# Patient Record
Sex: MALE | Race: WHITE
[De-identification: names, ages, dates, MRNs, and addresses within clinical notes are randomized per-mention and may not be internally consistent; named-entity substitution may affect disease eponyms.]

---

## 2022-06-14 ENCOUNTER — HOSPITAL ENCOUNTER (OUTPATIENT)
Dept: HOSPITAL 47 - ORWHC2ENDO | Age: 32
Discharge: HOME | End: 2022-06-14
Attending: INTERNAL MEDICINE
Payer: MEDICAID

## 2022-06-14 VITALS — RESPIRATION RATE: 16 BRPM | HEART RATE: 73 BPM | DIASTOLIC BLOOD PRESSURE: 69 MMHG | SYSTOLIC BLOOD PRESSURE: 114 MMHG

## 2022-06-14 VITALS — BODY MASS INDEX: 30.9 KG/M2

## 2022-06-14 VITALS — TEMPERATURE: 98.5 F

## 2022-06-14 DIAGNOSIS — K44.9: ICD-10-CM

## 2022-06-14 DIAGNOSIS — K21.9: ICD-10-CM

## 2022-06-14 DIAGNOSIS — F17.210: ICD-10-CM

## 2022-06-14 DIAGNOSIS — K29.50: ICD-10-CM

## 2022-06-14 DIAGNOSIS — K64.8: ICD-10-CM

## 2022-06-14 DIAGNOSIS — K21.00: Primary | ICD-10-CM

## 2022-06-14 PROCEDURE — 88305 TISSUE EXAM BY PATHOLOGIST: CPT

## 2022-06-14 PROCEDURE — 43239 EGD BIOPSY SINGLE/MULTIPLE: CPT

## 2022-06-14 PROCEDURE — 45378 DIAGNOSTIC COLONOSCOPY: CPT

## 2022-06-14 NOTE — P.PCN
Date of Procedure: 06/14/22


Procedure(s) Performed: 


Brief history:


Patient is a 31-year-old white male pleasant scheduled for an elective upper 

endoscopy as well as colonoscopy as a part of evaluation of GERD/intermittent 

rectal bleeding





Procedure performed:


Esophagogastroduodenoscopy with biopsy


Colonoscopy





Preoperative diagnosis:


GERD


Intermittent rectal bleeding





[place] [facility] [place]:


After informed consent was obtained from the patient  was brought into the 

endoscopy unit and IV  sedation was administered by anesthesia under continuous 

monitoring.  Initially upper endoscopy was done.  The Olympus  video 

endoscope was inserted inserted into the mouth and esophagus intubated without 

any difficulty and was gradually advanced into the stomach and duodenum and 

carefully examined.  The bulb and second part of the duodenum appeared normal.  

The scope was then withdrawn into the stomach adequately insufflated with air 

and upon careful examination  the antrum had mild gastritis and biopsies were 

done from this area.  body, cardia and fundus appeared normal.  The scope was 

then withdrawn into the esophagus.  The GE junction was located at 38 cm to the 

incisors.  Small hiatal hernia noted.  There were linear erosions in the distal 

esophagus with LA grade B/C reflux esophagitis.  Rest of the esophagus appeared 

normal.  Patient tolerated the procedure well.





At this time the patient continued to remain sedation.  Initial digital rectal 

examination was normal.  Olympus  video colonoscope was then inserted into

the rectum and gradually advanced to the cecum without any difficulty.  Careful 

examination was performed as the scope was gradually being withdrawn.  The prep 

was excellent.  Terminal ileum was visualized and appeared normal.  The cecum, 

ascending colon, transverse colon, descending colon, sigmoid colon and rectum 

appeared normal.  Retroflexion was performed in the rectum and small internal 

hemorrhoids were noted.  Patient tolerated the procedure well.





Impression:


1.  Upper endoscopy revealed linear erosions in the distal esophagus consistent 

with LA grade B reflux esophagitis, small hiatal and mild gastritis


2.  Colonoscopy was within normal limits with no evidence of colitis or 

colorectal neoplasia except for small internal hemorrhoids.








Recommendations:


Findings of this examination were discussed with the patient as well as his 

family.  He will be started on Prilosec 20 mg daily and was briefly educated 

about antireflux measures.  He will be a high-fiber diet and take fiber 

supplements a regular basis and avoid straining and constipation.